# Patient Record
Sex: MALE
[De-identification: names, ages, dates, MRNs, and addresses within clinical notes are randomized per-mention and may not be internally consistent; named-entity substitution may affect disease eponyms.]

---

## 2023-01-24 ENCOUNTER — APPOINTMENT (OUTPATIENT)
Dept: ORTHOPEDIC SURGERY | Facility: CLINIC | Age: 63
End: 2023-01-24
Payer: COMMERCIAL

## 2023-01-24 DIAGNOSIS — M25.562 PAIN IN LEFT KNEE: ICD-10-CM

## 2023-01-24 PROBLEM — Z00.00 ENCOUNTER FOR PREVENTIVE HEALTH EXAMINATION: Status: ACTIVE | Noted: 2023-01-24

## 2023-01-24 PROCEDURE — 73560 X-RAY EXAM OF KNEE 1 OR 2: CPT | Mod: 50

## 2023-01-24 PROCEDURE — 99203 OFFICE O/P NEW LOW 30 MIN: CPT

## 2023-01-25 NOTE — HISTORY OF PRESENT ILLNESS
[de-identified] : left knee pain\par had pain to posterior knee\par this has resolved\par he is doing much better\par \par lle\par min ttp post knee\par no palpable mass\par from\par nvi\par comp soft and nt\par no instability\par \par xray left knee mild degen changes\par \par plan\par went over findings\par explained popliteal cyst\par since he is doing well, will cont cons tx\par pt\par pain control as needed\par fu if pain returns